# Patient Record
(demographics unavailable — no encounter records)

---

## 2025-01-21 NOTE — DISCUSSION/SUMMARY
[Normal Growth] : growth [Normal Development] : development  [No Elimination Concerns] : elimination [Normal Sleep Pattern] : sleep [Appetite Normal] : normal appetite [Physical Growth and Development] : physical growth and development [Social and Academic Competence] : social and academic competence [Emotional Well-Being] : emotional well-being [Risk Reduction] : risk reduction [Violence and Injury Prevention] : violence and injury prevention [DTaP] : diptheria, tetanus and pertussis [HPV] : human papilloma [Patient] : patient [Mother] : mother [Full Activity without restrictions including Physical Education & Athletics] : Full Activity without restrictions including Physical Education & Athletics [FreeTextEntry2] : Aunt [FreeTextEntry1] : Patient is a 13yo F w/worsening headaches and anxiety.   #Headaches  - Patient should increase her water intake  - Patient should try to eat three consistent meals daily  - Patient should keep a headache diary  - Referral to neurology   # Anxiety  - Referral to therapy. Though patient's mom is of the belief patient should pray and have sudha in God, she also sees a role for therapy in her daughter's care and recognizes the need.   #Insomnia  - Patient should work on bedtime hygiene - consistent bedtime, avoiding the use of her phone when she wakes up.  - Addressing her anxiety should help with her sleep difficulties   #Vaccination  - Patient recieved TDaP, Meningoccal, and HPV vaccines
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
## 2025-01-21 NOTE — PHYSICAL EXAM
[Alert] : alert [No Acute Distress] : no acute distress [Normocephalic] : normocephalic [EOMI Bilateral] : EOMI bilateral [Pink Nasal Mucosa] : pink nasal mucosa [Nonerythematous Oropharynx] : nonerythematous oropharynx [Supple, full passive range of motion] : supple, full passive range of motion [No Palpable Masses] : no palpable masses [Clear to Auscultation Bilaterally] : clear to auscultation bilaterally [Regular Rate and Rhythm] : regular rate and rhythm [Normal S1, S2 audible] : normal S1, S2 audible [No Murmurs] : no murmurs [+2 Femoral Pulses] : +2 femoral pulses [Soft] : soft [NonTender] : non tender [Non Distended] : non distended [Normoactive Bowel Sounds] : normoactive bowel sounds [No Hepatomegaly] : no hepatomegaly [No Splenomegaly] : no splenomegaly [No Abnormal Lymph Nodes Palpated] : no abnormal lymph nodes palpated [Normal Muscle Tone] : normal muscle tone [No Gait Asymmetry] : no gait asymmetry [No pain or deformities with palpation of bone, muscles, joints] : no pain or deformities with palpation of bone, muscles, joints [Straight] : straight [+2 Patella DTR] : +2 patella DTR [Cranial Nerves Grossly Intact] : cranial nerves grossly intact [No Rash or Lesions] : no rash or lesions [FreeTextEntry1] : Patient well-appearing at start of exam, but biting her nails and tearful at conversations about her father and anxiety  [FreeTextEntry3] : Heavy wax bilaterally

## 2025-01-25 NOTE — PLAN
[FreeTextEntry1] :  - Obtain MRI brain to rule out structural cause -Headache hygiene reviewed with mother and patient including good sleep patterns, adequate hydration, and regularly scheduled meals.. Limit OTC medication to <3x/week -May consider use of nutraceuticals ( Magnesium 400mg, Riboflavin 100mg, CoQ10 200mg daily) or combination Migravent - Follow up 3 months- call sooner for worsening in intensity or frequency of headaches

## 2025-01-25 NOTE — ASSESSMENT
[FreeTextEntry1] : 12-year-old female with 3-year history of headaches which have been increasing in frequency- presenting for 2 weeks of persistent headaches without clear trigger.  Non-focal neuro exam

## 2025-01-25 NOTE — CONSULT LETTER
[Dear  ___] : Dear  [unfilled], [Courtesy Letter:] : I had the pleasure of seeing your patient, [unfilled], in my office today. [Please see my note below.] : Please see my note below. [Sincerely,] : Sincerely, [FreeTextEntry3] : Luisana Sung CPNP Certified Pediatric Nurse Practitioner  Pediatric Neurology  [facility]

## 2025-01-25 NOTE — REASON FOR VISIT
needed assist [Initial Consultation] : an initial consultation for [Headache] : headache [Mother] : mother

## 2025-01-25 NOTE — PHYSICAL EXAM
[Well-appearing] : well-appearing [No dysmorphic facial features] : no dysmorphic facial features [Normocephalic] : normocephalic [No ocular abnormalities] : no ocular abnormalities [Neck supple] : neck supple [Lungs clear] : lungs clear [Heart sounds regular in rate and rhythm] : heart sounds regular in rate and rhythm [Soft] : soft [No organomegaly] : no organomegaly [No abnormal neurocutaneous stigmata or skin lesions] : no abnormal neurocutaneous stigmata or skin lesions [Straight] : straight [No gee or dimples] : no gee or dimples [No deformities] : no deformities [Alert] : alert [Well related, good eye contact] : well related, good eye contact [Conversant] : conversant [Normal speech and language] : normal speech and language [Follows instructions well] : follows instructions well [VFF] : VFF [Pupils reactive to light and accommodation] : pupils reactive to light and accommodation [Full extraocular movements] : full extraocular movements [No nystagmus] : no nystagmus [No papilledema] : no papilledema [Normal facial sensation to light touch] : normal facial sensation to light touch [No facial asymmetry or weakness] : no facial asymmetry or weakness [Gross hearing intact] : gross hearing intact [Equal palate elevation] : equal palate elevation [Good shoulder shrug] : good shoulder shrug [Normal tongue movement] : normal tongue movement [Midline tongue, no fasciculations] : midline tongue, no fasciculations [Normal axial and appendicular muscle tone] : normal axial and appendicular muscle tone [Gets up on table without difficulty] : gets up on table without difficulty [No pronator drift] : no pronator drift [Normal finger tapping and fine finger movements] : normal finger tapping and fine finger movements [No abnormal involuntary movements] : no abnormal involuntary movements [5/5 strength in proximal and distal muscles of arms and legs] : 5/5 strength in proximal and distal muscles of arms and legs [Walks and runs well] : walks and runs well [Able to do deep knee bend] : able to do deep knee bend [Able to walk on heels] : able to walk on heels [Able to walk on toes] : able to walk on toes [2+ biceps] : 2+ biceps [Triceps] : triceps [Knee jerks] : knee jerks [Ankle jerks] : ankle jerks [No ankle clonus] : no ankle clonus [Localizes LT and temperature] : localizes LT and temperature [No dysmetria on FTNT] : no dysmetria on FTNT [Good walking balance] : good walking balance [Normal gait] : normal gait [Able to tandem well] : able to tandem well [Negative Romberg] : negative Romberg

## 2025-01-25 NOTE — PHYSICAL EXAM
[Well-appearing] : well-appearing [No dysmorphic facial features] : no dysmorphic facial features [Normocephalic] : normocephalic [No ocular abnormalities] : no ocular abnormalities [Neck supple] : neck supple [Lungs clear] : lungs clear [Heart sounds regular in rate and rhythm] : heart sounds regular in rate and rhythm [Soft] : soft [No organomegaly] : no organomegaly [No abnormal neurocutaneous stigmata or skin lesions] : no abnormal neurocutaneous stigmata or skin lesions [Straight] : straight [No gee or dimples] : no gee or dimples [No deformities] : no deformities [Alert] : alert [Well related, good eye contact] : well related, good eye contact [Conversant] : conversant [Normal speech and language] : normal speech and language [Follows instructions well] : follows instructions well [VFF] : VFF [Pupils reactive to light and accommodation] : pupils reactive to light and accommodation [Full extraocular movements] : full extraocular movements [No nystagmus] : no nystagmus [No papilledema] : no papilledema [Normal facial sensation to light touch] : normal facial sensation to light touch [No facial asymmetry or weakness] : no facial asymmetry or weakness [Gross hearing intact] : gross hearing intact [Equal palate elevation] : equal palate elevation [Good shoulder shrug] : good shoulder shrug [Normal tongue movement] : normal tongue movement [Midline tongue, no fasciculations] : midline tongue, no fasciculations [Normal axial and appendicular muscle tone] : normal axial and appendicular muscle tone [Gets up on table without difficulty] : gets up on table without difficulty [No pronator drift] : no pronator drift [Normal finger tapping and fine finger movements] : normal finger tapping and fine finger movements [No abnormal involuntary movements] : no abnormal involuntary movements [5/5 strength in proximal and distal muscles of arms and legs] : 5/5 strength in proximal and distal muscles of arms and legs [Walks and runs well] : walks and runs well [Able to do deep knee bend] : able to do deep knee bend [Able to walk on heels] : able to walk on heels [Able to walk on toes] : able to walk on toes [2+ biceps] : 2+ biceps [Triceps] : triceps [Knee jerks] : knee jerks [Ankle jerks] : ankle jerks [No ankle clonus] : no ankle clonus [Localizes LT and temperature] : localizes LT and temperature [No dysmetria on FTNT] : no dysmetria on FTNT [Good walking balance] : good walking balance [Able to tandem well] : able to tandem well [Normal gait] : normal gait [Negative Romberg] : negative Romberg

## 2025-01-25 NOTE — CONSULT LETTER
[Dear  ___] : Dear  [unfilled], [Please see my note below.] : Please see my note below. [Courtesy Letter:] : I had the pleasure of seeing your patient, [unfilled], in my office today. [Sincerely,] : Sincerely, [FreeTextEntry3] : Luisana Sung CPNP Certified Pediatric Nurse Practitioner  Pediatric Neurology  [facility]

## 2025-01-25 NOTE — HISTORY OF PRESENT ILLNESS
[FreeTextEntry1] : Luis is a 12 year old female here for initial visit for headaches,   Luis reports she has been having headaches for the past 3 years.  Initially headaches were infrequent occurring 1-2 times per month.  Headaches are described as a pounding pain rated 8/10 associated with photophobia. She denies dizziness, nausea or vomiting.  Headaches would typically occur in the afternoon and would resolve with sleep.   Over the past year headaches have been increasing in frequency and now occurring 2-3 times per week.  Headaches continue to occur in the afternoon where Luis will need to leave school early.  She will come home and go to sleep and upon waking, headaches are either improved or resolved.  Luis denies headaches waking her from sleep or headaches in AM upon waking.   Luis presents today as she has had a persistent headache for the past 2 week.  Headache with similar features as baseline but only rated 5-6/10.  She has missed multiple days of school due to pain.   Luis is currently in 7th grade. Despite multiple missed days of school, she is doing well academically.   Diet: no breakfast, eats meals in afternoon Sleep: 12am- 5am -  Hydration:  40oz, no caffeine   Family hx of migraines in Mother.